# Patient Record
Sex: FEMALE | Race: WHITE | NOT HISPANIC OR LATINO | Employment: FULL TIME | URBAN - METROPOLITAN AREA
[De-identification: names, ages, dates, MRNs, and addresses within clinical notes are randomized per-mention and may not be internally consistent; named-entity substitution may affect disease eponyms.]

---

## 2024-10-24 ENCOUNTER — OFFICE VISIT (OUTPATIENT)
Dept: NEUROLOGY | Facility: CLINIC | Age: 39
End: 2024-10-24
Payer: COMMERCIAL

## 2024-10-24 VITALS
WEIGHT: 178.6 LBS | DIASTOLIC BLOOD PRESSURE: 92 MMHG | HEIGHT: 64 IN | HEART RATE: 80 BPM | OXYGEN SATURATION: 99 % | BODY MASS INDEX: 30.49 KG/M2 | SYSTOLIC BLOOD PRESSURE: 140 MMHG

## 2024-10-24 DIAGNOSIS — R51.9 CHRONIC DAILY HEADACHE: Primary | ICD-10-CM

## 2024-10-24 DIAGNOSIS — R41.3 MEMORY DIFFICULTIES: ICD-10-CM

## 2024-10-24 PROCEDURE — 99204 OFFICE O/P NEW MOD 45 MIN: CPT | Performed by: PSYCHIATRY & NEUROLOGY

## 2024-10-24 RX ORDER — VENLAFAXINE HYDROCHLORIDE 150 MG/1
150 CAPSULE, EXTENDED RELEASE ORAL
COMMUNITY
Start: 2024-09-27

## 2024-10-24 RX ORDER — HYDROXYZINE HYDROCHLORIDE 25 MG/1
25 TABLET, FILM COATED ORAL AS NEEDED
COMMUNITY
Start: 2024-10-11

## 2024-10-24 RX ORDER — GABAPENTIN 100 MG/1
100 CAPSULE ORAL
Qty: 30 CAPSULE | Refills: 5 | Status: SHIPPED | OUTPATIENT
Start: 2024-10-24

## 2024-10-24 NOTE — ASSESSMENT & PLAN NOTE
Orders:    MRI brain without contrast; Future    Folate; Future    Lyme Total AB W Reflex to IGM/IGG; Future    RPR (DX) W/REFL TITER AND CONFIRM TESTING (REFL); Future    Sedimentation rate, automated; Future    TSH, 3rd generation with Free T4 reflex; Future    CBC and differential; Future    Comprehensive metabolic panel; Future    C-reactive protein; Future    gabapentin (Neurontin) 100 mg capsule; Take 1 capsule (100 mg total) by mouth daily at bedtime  Differential diagnosis discussed with the patient and her , she does have chronic daily headaches with possibility of analgesic rebound headache and some features of migraine headaches, would recommend an MRI of the brain and blood work to evaluate for her symptoms, patient to call me after the test to discuss the results.    She was advised to avoid migraine triggers which we discussed in detail including foods to avoid, she was advised to keep herself well-hydrated, avoid excessive use of over-the-counter analgesics and to limit it to twice a week, we also discussed different prophylactic medications she was advised to take magnesium 400 mg at nighttime and riboflavin 400 mg a day side effects of the medication including GI side effects and yellowish discoloration of the urine discussed with the patient, to stop if experiences any side effects.    Also discussed with her about other prophylactic medications including gabapentin she is agreeable side effects discussed with her she is going to discuss with her psychiatrist and if agreeable will take 100 mg at nighttime and see how she does.  To stop if experiences any side effects.  She was advised to limit Excedrin Migraine to only 1 or 2 times a week.  Also was advised to quit vaping, to keep her blood pressure, cholesterol, sugar under control, to go to the hospital if has any worsening symptoms and call me otherwise to see me back in 3 months or sooner if needed and follow-up with the other  physicians

## 2024-10-24 NOTE — ASSESSMENT & PLAN NOTE
Orders:    MRI brain without contrast; Future    Vitamin B12; Future    EEG Routine and awake; Future    Ambulatory Referral to Occupational Therapy; Future    Ambulatory referral to Neuropsychology; Future

## 2024-10-24 NOTE — PROGRESS NOTES
Neurology Ambulatory Visit  Name: Kelsi Ross       : 1985       MRN: [id number]   Encounter Provider: Shayna Cunha MD   Encounter Date: 10/24/2024  Encounter department: [facility]      Kelsi Ross is a 39 y.o. female.       Assessment & Plan  Chronic daily headache    Orders:    MRI brain without contrast; Future    Folate; Future    Lyme Total AB W Reflex to IGM/IGG; Future    RPR (DX) W/REFL TITER AND CONFIRM TESTING (REFL); Future    Sedimentation rate, automated; Future    TSH, 3rd generation with Free T4 reflex; Future    CBC and differential; Future    Comprehensive metabolic panel; Future    C-reactive protein; Future    gabapentin (Neurontin) 100 mg capsule; Take 1 capsule (100 mg total) by mouth daily at bedtime  Differential diagnosis discussed with the patient and her , she does have chronic daily headaches with possibility of analgesic rebound headache and some features of migraine headaches, would recommend an MRI of the brain and blood work to evaluate for her symptoms, patient to call me after the test to discuss the results.    She was advised to avoid migraine triggers which we discussed in detail including foods to avoid, she was advised to keep herself well-hydrated, avoid excessive use of over-the-counter analgesics and to limit it to twice a week, we also discussed different prophylactic medications she was advised to take magnesium 400 mg at nighttime and riboflavin 400 mg a day side effects of the medication including GI side effects and yellowish discoloration of the urine discussed with the patient, to stop if experiences any side effects.    Also discussed with her about other prophylactic medications including gabapentin she is agreeable side effects discussed with her she is going to discuss with her psychiatrist and if agreeable will take 100 mg at nighttime and see how she does.  To stop if experiences any side effects.   She was advised to limit Excedrin Migraine to only 1 or 2 times a week.  Also was advised to quit vaping, to keep her blood pressure, cholesterol, sugar under control, to go to the hospital if has any worsening symptoms and call me otherwise to see me back in 3 months or sooner if needed and follow-up with the other physicians  Memory difficulties    Orders:    MRI brain without contrast; Future    Vitamin B12; Future    EEG Routine and awake; Future    Ambulatory Referral to Occupational Therapy; Future    Ambulatory referral to Neuropsychology; Future    [facility] discussed with the patient most likely multifactorial given her history of attention deficit disorder rather than a true cognitive impairment her MoCA test 24/30 who would await MRI of the brain also would recommend EEG and neuropsych testing patient to call me after the above test to discuss the results she was advised to go for cognitive therapy, also was advised to take fall and safety precautions and to be careful with her driving, to take a multivitamin every day, sleep hygiene was discussed with her, she was advised to follow-up with her psychiatrist and psychologist regarding her history of depression and ADHD, to go to the hospital if has any worsening symptoms and call me otherwise to see me back in 3 months or sooner if needed and follow-up with the other physicians her  was on the phone and was in agreement with the plan.    Subjective:  Chief Complaint   Patient presents with    Memory Issues       HISTORY OF PRESENT ILLNESS    39-year-old female with past medical history of ADHD and depression and evaluation for headaches and memory difficulties her  was on the phone as per the patient and the  she has been having headaches for many years but they have been getting worse for the last 2 years she has almost headache every day it can be on the left side or on top of the head it is throbbing to sharp in nature  8-9 on 10 it could last for a few hours associated with photophobia phonophobia she has been taking Excedrin Migraine anywhere between 2-4 a day for her to help with the headaches, she denies any visual aura no motor or sensory symptoms in upper or lower extremities she also has some difficulty with attention and concentration and has prior history of ADHD and depression but denies any suicidal or homicidal thoughts she has been having forgetting recent conversations at work sometimes she might forget certain things but she is able to drive her  manages the finances, she drinks 1 cup of coffee a day denies any alcohol use she does vape denies any other recreational drug use, no family history of migraine headaches, no family history of early dementia, appetite is good weight has been stable, her sleep is slightly disturbed she does follow-up with her psychiatrist regarding her mood and ADHD, no other complaints.             Past Medical History:    Past Medical History:   Diagnosis Date    ADHD     Anxiety      Past Surgical History:   Procedure Laterality Date    EYE SURGERY Left     GALLBLADDER SURGERY         Family History:  Family History   Family history unknown: Yes       Social History:  Social History     Tobacco Use    Smoking status: Former     Current packs/day: 1.00     Average packs/day: 1 pack/day for 15.0 years (15.0 ttl pk-yrs)     Types: Cigarettes    Smokeless tobacco: Never   Vaping Use    Vaping status: Never Used   Substance Use Topics    Alcohol use: Not Currently    Drug use: Never      Living situation:    Work:      Allergies:  No Known Allergies    Medications:    Current Outpatient Medications:     ADDERALL XR, 15MG, 15 MG 24 hr capsule, Take 15 mg by mouth every morning, Disp: , Rfl:     ADDERALL, 10MG, 10 MG tablet, Take 10 mg by mouth in the morning 1 tablet in the afternoon, Disp: , Rfl:     gabapentin (Neurontin) 100 mg capsule, Take 1 capsule (100 mg total) by mouth daily at  "bedtime, Disp: 30 capsule, Rfl: 5    hydrOXYzine HCL (ATARAX) 25 mg tablet, Take 25 mg by mouth if needed, Disp: , Rfl:     venlafaxine (EFFEXOR-XR) 150 mg 24 hr capsule, Take 150 mg by mouth daily at bedtime, Disp: , Rfl:     diclofenac (VOLTAREN) 75 mg EC tablet, TAKE 1 TABLET BY MOUTH TWICE A DAY (Patient not taking: Reported on 10/24/2024), Disp: 60 tablet, Rfl: 0    escitalopram (LEXAPRO) 10 mg tablet, Take 10 mg by mouth daily, Disp: , Rfl:     Objective:  /92 (BP Location: Left arm, Patient Position: Sitting, Cuff Size: Standard)   Pulse 80   Ht 5' 4\" (1.626 m)   Wt 81 kg (178 lb 9.6 oz)   SpO2 99%   BMI 30.66 kg/m²      Labs  I have reviewed pertinent labs:  CBC: No results found for: \"WBC\", \"RBC\", \"HGB\", \"HCT\", \"MCV\", \"PLT\", \"ADJUSTEDWBC\", \"MCH\", \"MCHC\", \"RDW\", \"MPV\", \"NEUTROABS\"  CMP: No results found for: \"SODIUM\", \"K\", \"CL\", \"CO2\", \"AGAP\", \"BUN\", \"CREATININE\", \"GLUC\", \"GLUF\", \"CALCIUM\", \"AST\", \"ALT\", \"ALKPHOS\", \"TP\", \"ALB\", \"TBILI\", \"EGFR\"  Vitamin D Level No results found for: \"SHXO13LRKBBQ\", \"[id number]\"           General Exam  GENERAL APPEARANCE:  No distress, alert, interactive and cooperative.  CARDIOVASCULAR: Warm and well perfused  LUNGS: normal work of breathing on room air  EXTREMITIES: no peripheral edema     Neurologic Exam  MENTAL STATE:  Orientation was normal to time, place and person without aphasia or apraxia.  MoCA is 24/30    CRANIAL NERVES:  CN 2       visual fields full to confrontation.  Visual acuity is 20/20 with hand-held chart  CN 3, 4, 6  Pupils round, 4 mm in diameter, equally reactive to light. Lids symmetric; no ptosis. EOMs normal alignment, full range. No nystagmus.  CN 5  Facial sensation intact bilaterally.  CN 7  Normal and symmetric facial strength.   CN 8  Hearing intact to finger rub bilaterally.  CN 9  Palate elevates symmetrically.  CN 11  Normal strength of shoulder shrug and neck turning.  CN 12  Tongue midline, with normal bulk and strength.   "   MOTOR:  Motor exam was normal. Muscle bulk and tone were normal in both upper and lower extremities. Muscle strength was 5/5 in distal and proximal muscles in both upper and lower extremities. No fasciculations, tremor or other abnormal movements were present.     REFLEXES:  RIGHT UE   LEFT UE  BR:2              BR:2    Biceps:2      Biceps:2    Triceps:2     Triceps:2       RIGHT LLE   LEFT LLE    Knee:2           Knee:2    Ankle:2         Ankle:2    Babinski: toes downgoing to plantar stimulation. No clonus.     SENSORY:  Intact to temperature and vibratory sensation in the upper and lower extremities bilaterally. Cortical function is intact.    COORDINATION:   Coordination exam was normal. In both upper extremities, finger-nose-finger was intact without dysmetria or overshoot.      GAIT:  Gait was normal. Station was stable with a normal base. Gait was stable with a normal arm swing and speed. Tandem gait was intact. No Romberg sign was present.    No temporal artery tenderness, no cervical spine tenderness.      ROS:  Review of Systems   Constitutional:  Negative for appetite change, fatigue and fever.   HENT: Negative.  Negative for hearing loss, tinnitus, trouble swallowing and voice change.    Eyes: Negative.  Negative for photophobia, pain and visual disturbance.   Respiratory: Negative.  Negative for shortness of breath.    Cardiovascular: Negative.  Negative for palpitations.   Gastrointestinal: Negative.  Negative for nausea and vomiting.   Endocrine: Negative.  Negative for cold intolerance.   Genitourinary: Negative.  Negative for dysuria, frequency and urgency.   Musculoskeletal:  Negative for back pain, gait problem, myalgias, neck pain and neck stiffness.   Skin: Negative.  Negative for rash.   Allergic/Immunologic: Negative.    Neurological:  Positive for headaches. Negative for dizziness, tremors, seizures, syncope, facial asymmetry, speech difficulty, weakness, light-headedness and numbness.    Hematological: Negative.  Does not bruise/bleed easily.   Psychiatric/Behavioral:  Positive for confusion. Negative for hallucinations and sleep disturbance.        I have reviewed the past medical history, surgical history, social and family history, current medications, allergies vitals, review of systems, and updated this information as appropriate today.    Administrative Statements   The total amount of time spent with the patient and on chart review and documentation was  45 minutes. Issues addressed during this clinic visit included overall management, medication counseling or monitoring, and counseling and coordination of care.         Shayna Cunha MD

## 2024-10-29 ENCOUNTER — TELEPHONE (OUTPATIENT)
Dept: OBGYN CLINIC | Facility: CLINIC | Age: 39
End: 2024-10-29

## 2024-10-29 NOTE — TELEPHONE ENCOUNTER
LMOM for patient to call us back to confirm Yearly Exam Appointment rescheduled to Thursday 10/31 at 11:30am in [place] office. This appointment was originally scheduled for 11/8 at 9am however the provider won't be available in the office on that day. Will also send patient a [employer] message for confirmation of rescheduled appointment as well.

## 2024-10-31 ENCOUNTER — TELEPHONE (OUTPATIENT)
Dept: OBGYN CLINIC | Facility: CLINIC | Age: 39
End: 2024-10-31

## 2024-10-31 NOTE — TELEPHONE ENCOUNTER
LMOM X2 For patient to call us back for confirmation of rescheduled Yearly Exam Appointment. Rescheduled appointment is for today in [place] office at 11:30. Appointment originally was for 11/8 in [place] however, the provider won't be available in the office on that day.

## 2024-11-01 ENCOUNTER — TELEPHONE (OUTPATIENT)
Age: 39
End: 2024-11-01

## 2024-11-01 NOTE — TELEPHONE ENCOUNTER
Writer left VM in regards to the ROF. Writer will send outside resources when patient returns call. [facility] is closing referral at this time due to not having a provider to complete the requested service.  left call back # [phone].

## 2024-11-05 ENCOUNTER — HOSPITAL ENCOUNTER (OUTPATIENT)
Dept: RADIOLOGY | Facility: HOSPITAL | Age: 39
Discharge: HOME | End: 2024-11-05
Payer: COMMERCIAL

## 2024-11-05 DIAGNOSIS — R51.9 CHRONIC DAILY HEADACHE: ICD-10-CM

## 2024-11-05 DIAGNOSIS — R41.3 MEMORY DIFFICULTIES: ICD-10-CM

## 2024-11-05 PROCEDURE — 70551 MRI BRAIN STEM W/O DYE: CPT

## 2024-11-14 ENCOUNTER — HOSPITAL ENCOUNTER (OUTPATIENT)
Dept: NEUROLOGY | Facility: HOSPITAL | Age: 39
End: 2024-11-14
Payer: COMMERCIAL

## 2024-11-14 DIAGNOSIS — R41.3 MEMORY DIFFICULTIES: ICD-10-CM

## 2024-11-14 PROCEDURE — 95816 EEG AWAKE AND DROWSY: CPT | Performed by: PSYCHIATRY & NEUROLOGY

## 2024-11-14 PROCEDURE — 95816 EEG AWAKE AND DROWSY: CPT
